# Patient Record
Sex: MALE | Race: WHITE | NOT HISPANIC OR LATINO | URBAN - METROPOLITAN AREA
[De-identification: names, ages, dates, MRNs, and addresses within clinical notes are randomized per-mention and may not be internally consistent; named-entity substitution may affect disease eponyms.]

---

## 2019-01-14 ENCOUNTER — APPOINTMENT (RX ONLY)
Dept: URBAN - METROPOLITAN AREA CLINIC 71 | Facility: CLINIC | Age: 39
Setting detail: DERMATOLOGY
End: 2019-01-14

## 2019-01-14 DIAGNOSIS — K13.0 DISEASES OF LIPS: ICD-10-CM

## 2019-01-14 PROBLEM — L85.3 XEROSIS CUTIS: Status: ACTIVE | Noted: 2019-01-14

## 2019-01-14 PROBLEM — L20.84 INTRINSIC (ALLERGIC) ECZEMA: Status: ACTIVE | Noted: 2019-01-14

## 2019-01-14 PROBLEM — L29.8 OTHER PRURITUS: Status: ACTIVE | Noted: 2019-01-14

## 2019-01-14 PROBLEM — K21.9 GASTRO-ESOPHAGEAL REFLUX DISEASE WITHOUT ESOPHAGITIS: Status: ACTIVE | Noted: 2019-01-14

## 2019-01-14 PROBLEM — F41.9 ANXIETY DISORDER, UNSPECIFIED: Status: ACTIVE | Noted: 2019-01-14

## 2019-01-14 PROBLEM — F32.9 MAJOR DEPRESSIVE DISORDER, SINGLE EPISODE, UNSPECIFIED: Status: ACTIVE | Noted: 2019-01-14

## 2019-01-14 PROCEDURE — ? PRESCRIPTION

## 2019-01-14 PROCEDURE — ? COUNSELING

## 2019-01-14 PROCEDURE — ? TREATMENT REGIMEN

## 2019-01-14 PROCEDURE — 99212 OFFICE O/P EST SF 10 MIN: CPT

## 2019-01-14 RX ORDER — HYDROCORTISONE 25 MG/G
CREAM TOPICAL BID
Qty: 1 | Refills: 2 | COMMUNITY
Start: 2019-01-14

## 2019-01-14 RX ADMIN — HYDROCORTISONE: 25 CREAM TOPICAL at 20:42

## 2019-01-14 NOTE — HPI: DRY SKIN
Additional History: Patient states that his mother past away 6 weeks ago and that his lips have been very dry and chapped.